# Patient Record
(demographics unavailable — no encounter records)

---

## 2024-11-30 NOTE — ASSESSMENT
[FreeTextEntry1] : A/P R lumbar spasm without radiculopathy - medrol dose tracy - muscle relaxant - TPI - f/u spine/pain management

## 2024-11-30 NOTE — PROCEDURE
[Trigger point 1-2 muscle groups] : trigger point 1-2 muscle groups [Right] : of the right [Lumbar paraspinal muscle] : lumbar paraspinal muscle [Pain] : pain [Inflammation] : inflammation [X-ray evidence of Osteoarthritis on this or prior visit] : x-ray evidence of Osteoarthritis on this or prior visit [Alcohol] : alcohol [Betadine] : betadine [Ethyl Chloride sprayed topically] : ethyl chloride sprayed topically [Sterile technique used] : sterile technique used [___ cc    3mg] :  Betamethasone (Celestone) ~Vcc of 3mg [___ cc    1%] : Lidocaine ~Vcc of 1%  [___ cc    0.25%] : Bupivacaine (Marcaine) ~Vcc of 0.25%  [] : Patient tolerated procedure well [Call if redness, pain or fever occur] : call if redness, pain or fever occur [Apply ice for 15min out of every hour for the next 12-24 hours as tolerated] : apply ice for 15 minutes out of every hour for the next 12-24 hours as tolerated [Patient was advised to rest the joint(s) for ____ days] : patient was advised to rest the joint(s) for [unfilled] days [Risks, benefits, alternatives discussed / Verbal consent obtained] : the risks benefits, and alternatives have been discussed, and verbal consent was obtained

## 2024-11-30 NOTE — IMAGING
[No bony abnormalities] : No bony abnormalities [Disc space narrowing] : Disc space narrowing [AP] : anteroposterior [There are no fractures, subluxations or dislocations. No significant abnormalities are seen] : There are no fractures, subluxations or dislocations. No significant abnormalities are seen [de-identified] : PE lumbar spine: +tenderness to R paraspinals, no midline tenderness, limited motion due to pain, able to SLR with pain, motor and sensory intact, NVI

## 2024-11-30 NOTE — HISTORY OF PRESENT ILLNESS
[Lower back] : lower back [9] : 9 [0] : 0 [Localized] : localized [Stabbing] : stabbing [Intermittent] : intermittent [Sitting] : sitting [de-identified] : 75 y/o M with atraumatic low back pain x 2 weeks. Denies numbness/tingling. denies bladder or bowel issues. He has tried Tylenol and aleve without relief. denies DM. s/p R CASSIUS 2002 [] : no [FreeTextEntry5] : Patient complains of low back pain for 2x weeks. no specific injury, pt has a right CASSIUS from 2002. no prior hx.

## 2024-12-10 NOTE — DISCUSSION/SUMMARY
[de-identified] : After discussing various treatment options with the patient including but not limited to oral medications, physical therapy, exercise, as well as interventional spinal injections, we have decided with the following plan:  - Continue home exercises, stretching, activity modification, physical therapy, and conservative care. - Will order lumbar MRI to rule out HNP. Please let this note serve as a formal request for authorization to perform a MRI of their Lumbar Spine. - Follow-up post diagnostic imaging to review and discuss further treatment. - Recommend continue Cyclobenzaprine 10mg BID PRN for muscle spasms and to assist with pain relief.

## 2024-12-10 NOTE — PHYSICAL EXAM
[de-identified] : Constitutional; Appears well, no apparent distress Ability to communicate: Normal  Respiratory: non-labored breathing Skin: No rash noted Head: Normocephalic, atraumatic Neck: no visible thyroid enlargement Eyes: Extraocular movements intact Neurologic: Alert and oriented x3 Psychiatric: normal mood, affect and behavior [] : non-antalgic

## 2024-12-10 NOTE — HISTORY OF PRESENT ILLNESS
[Lower back] : lower back [Right Leg] : right leg [Stabbing] : stabbing [] : yes [Frequent] : frequent [Household chores] : household chores [Rest] : rest [Injection therapy] : injection therapy [Sitting] : sitting [FreeTextEntry1] : Initial HPI 12/10/2024: Pain started 3 weeks ago and is on the RIGHT side of the lower back and radiates into the right buttock described as a sharp pain. Went to urgent care who recommended pain mgt. Pain has started to get better.   X-rays Lumbar spine: No bony abnormalities. Disc space narrowing.   MRI Lumbar Spine: none   Conservative Care: heat PRN  Pain Medications: Tylenol PRN; completed MDP with no relief; Flexeril is helping.  Past Injections:  TPI with good relief  Spine surgery: none  Blood thinners: *pt takes 81mg Aspirin  [FreeTextEntry7] : rt leg  [FreeTextEntry8] : driving  [de-identified] : twisting

## 2024-12-20 NOTE — ASSESSMENT
[FreeTextEntry1] : Previous doc: Right CASSIUS 2002 with mild asymmetric wear but no significant hip pain. Has all low back symptoms today. Also mod OA both knees (R > L) with good relief from HA injections in the past. PT and NSAIDs for Lspine, blood work for hip and monitor hip with serial XRs each year. Start HA injections b/l knees when he returns from vacation. 6/12/18: Inj tolerated well. 6/19/18: inj tolerated well. 6/26/18: inj tolerated well. 11/26/19: Mod OA both knees, no XR progression. Good long term relief from last HA series. Start orthovisc today both knees. 12/3/19: Inj tolerated well. 12/10/19: Inj tolerated well. 12/17/19: Inj tolerated well. 6/30/23: Moderate bilateral knee OA with some mild progression from 2019. Discussed anti-inflammatories, PT/HEP, CSI, visco injections, and briefly TKA. Will repeat Orthovisc series today as it has been very beneficial to him in the past. f/up in 1 week to continue series  9/11/23: Reeval today and symptoms are unchanged after fist orthovisc 3 months ago.  Discussed risks and benefits of proceeding after he had delay in treatment but would like to resume injections.  Orthovisc #2 both knees tolerated well, return next week. 9/19/23: Inj tolerated well. 9/25/23: Inj tolerated well  12/19/24: Right-sided lumbar spasm. R CASSIUS is stable and doing well. he is seeing Dr. JUAN Calix and is scheduled for MRI L-spine in a couple weeks. I am recommending the use of Meloxicam 15mg daily and discussed the risks and benefits of the medication, patient elects to proceed with this. Will also send cyclobenzaprine 10mg and give a PT rx. He is going to follow up in the new year with KUNAL Singh to consider restarting Orthovisc series for his knees.

## 2024-12-20 NOTE — HISTORY OF PRESENT ILLNESS
[Dull/Aching] : dull/aching [Throbbing] : throbbing [de-identified] : 12/19/24: 77yo M with right-sided lower back pain for the past few weeks with no injury. He saw KUNAL Pratt and had a TPI with good relief. Also taking cyclobenzaprine.   Previous doc: Right CASSIUS 2002 with Dr. Campos. Started having some pain over the past few months. 1 time episode of groin pain which has not happened again. Some improvement with Tylenol. Also has b/l knee OA - multiple HA injections in the past with great pain relief. 6/12/18: Orthovisc #1 b/l knees. 6/19/18: Orthovisc #2 b/l knees. 6/26/18: Orthovisc #3 b/l knees, improving. 11/16/19: HA inj in June 2018 helped until 2 months ago. Pain is mild. 12/3/19: Orthovisc #2 b/l knees. 12/10/19: Orthovisc #3 b/l knees. 12/17/19: Orthovisc #4 b/l knees, no pain. 6/30/23: 75yo M, known to me for bilateral knee OA, presents with gradually worsening pain over the past 2 months with no injury. He finished an Orthovisc series in 2019 that provided much relief. Some pain and difficulty with prolonged walking/standing, startup, and stairs. He is able to perform all his daily activities, but now with some pain. Occasional Tylenol  9/11/23: Had orthovisc #1 on 6/30 then difficulty getting appointments after that and then was sick.  Here today to resume injections. 9/19/23: Orthovisc #3 b/l knees. 9/25/23: Orthovisc #4 bilateral knees  [FreeTextEntry5] : hx of R CASSIUS with Dr Roblero. went to  and saw PA and was given CSI injection and was given nsaids. Was told it was a pull from the back. pain worse movement.  also Dr Calix and was recommended to get an MRI.

## 2024-12-20 NOTE — DISCUSSION/SUMMARY
[de-identified] : The patient was advised of the diagnosis.  The natural history of the pathology was explained in full to the patient in layman's terms. All questions were answered.  The risks and benefits of surgical and non-surgical treatment alternatives were explained in full to the patient.  The patient's orthopaedic condition(s) warrants intermittent use of a prescription strength non-steroidal anti-inflammatory medication.  These medications are associated with risks including but not limited to gastrointestinal irritation, kidney damage, hypertension, and bleeding.  The patient understands and will take medications as prescribed.  The patient will stop the medication and consult a physician as needed if problems arise.  Progress Note completed by Annika Carmona PA-C * Dr. Roblero -- The documentation recorded in this note accurately reflects the decisions made by me during this visit.  I interviewed and examined the patient personally and oversaw all medical decisions.

## 2024-12-20 NOTE — IMAGING
[de-identified] : RIGHT KNEE No Effusion +Medial joint line tenderness ROM 0-120 5/5 Strength NVI   LEFT KNEE No Effusion +Medial joint line tenderness ROM 0-120 5/5 Strength NVI Non-antalgic gait w/o assistance  RIGHT HIP (-) Groin tenderness (-) Greater troch bursa tenderness (-) Buttock tenderness +Right lower back tenderness  Limited flexion, internal rotation, external rotation No pain with ROM NVI

## 2025-01-17 NOTE — DISCUSSION/SUMMARY
[de-identified] : The patient was advised of the diagnosis.  The natural history of the pathology was explained in full to the patient in layman's terms. All questions were answered.  The risks and benefits of surgical and non-surgical treatment alternatives were explained in full to the patient.  I saw the patient under the supervision of Dr. Roblero and followed his plan of care.

## 2025-01-17 NOTE — PROCEDURE
[Large Joint Injection] : Large joint injection [Bilateral] : bilaterally of the [Knee] : knee [Pain] : pain [Inflammation] : inflammation [X-ray evidence of Osteoarthritis on this or prior visit] : x-ray evidence of Osteoarthritis on this or prior visit [Repeat series performed] : repeat series performed [Betadine] : betadine [Ethyl Chloride sprayed topically] : ethyl chloride sprayed topically [Sterile technique used] : sterile technique used [Orthovisc (30mg)] : 30mg of Orthovisc [#1] : series #1 [] : Patient tolerated procedure well [Call if redness, pain or fever occur] : call if redness, pain or fever occur [Apply ice for 15min out of every hour for the next 12-24 hours as tolerated] : apply ice for 15 minutes out of every hour for the next 12-24 hours as tolerated [Previous OTC use and PT nontherapeutic] : patient has tried OTC's including aspirin, Ibuprofen, Aleve, etc or prescription NSAIDS, and/or exercises at home and/or physical therapy without satisfactory response [Risks, benefits, alternatives discussed / Verbal consent obtained] : the risks benefits, and alternatives have been discussed, and verbal consent was obtained [All ultrasound images have been permanently captured and stored accordingly in our picture archiving and communication system] : All ultrasound images have been permanently captured and stored accordingly in our picture archiving and communication system [Visualization of the needle and placement of injection was performed without complication] : visualization of the needle and placement of injection was performed without complication

## 2025-01-17 NOTE — HISTORY OF PRESENT ILLNESS
[Dull/Aching] : dull/aching [de-identified] : 1/17/25: Went to PT for Lspine but started having more knee pain.  Both knees hurt equally,  Previous doc: Right CASSIUS 2002 with Dr. Campos. Started having some pain over the past few months. 1 time episode of groin pain which has not happened again. Some improvement with Tylenol. Also has b/l knee OA - multiple HA injections in the past with great pain relief. 6/12/18: Orthovisc #1 b/l knees. 6/19/18: Orthovisc #2 b/l knees. 6/26/18: Orthovisc #3 b/l knees, improving. 11/16/19: HA inj in June 2018 helped until 2 months ago. Pain is mild. 12/3/19: Orthovisc #2 b/l knees. 12/10/19: Orthovisc #3 b/l knees. 12/17/19: Orthovisc #4 b/l knees, no pain. 6/30/23: 75yo M, known to me for bilateral knee OA, presents with gradually worsening pain over the past 2 months with no injury. He finished an Orthovisc series in 2019 that provided much relief. Some pain and difficulty with prolonged walking/standing, startup, and stairs. He is able to perform all his daily activities, but now with some pain. Occasional Tylenol  9/11/23: Had orthovisc #1 on 6/30 then difficulty getting appointments after that and then was sick.  Here today to resume injections. 9/19/23: Orthovisc #3 b/l knees. 9/25/23: Orthovisc #4 bilateral knees  12/19/24: 75yo M with right-sided lower back pain for the past few weeks with no injury. He saw KUNAL Pratt and had a TPI with good relief. Also taking cyclobenzaprine.  [FreeTextEntry5] : wants to repeat orthovisc

## 2025-01-17 NOTE — ASSESSMENT
[FreeTextEntry1] : Previous doc: Right CASSIUS 2002 with mild asymmetric wear but no significant hip pain. Has all low back symptoms today. Also mod OA both knees (R > L) with good relief from HA injections in the past. PT and NSAIDs for Lspine, blood work for hip and monitor hip with serial XRs each year. Start HA injections b/l knees when he returns from vacation. 6/12/18: Inj tolerated well. 6/19/18: inj tolerated well. 6/26/18: inj tolerated well. 11/26/19: Mod OA both knees, no XR progression. Good long term relief from last HA series. Start orthovisc today both knees. 12/3/19: Inj tolerated well. 12/10/19: Inj tolerated well. 12/17/19: Inj tolerated well. 6/30/23: Moderate bilateral knee OA with some mild progression from 2019. Discussed anti-inflammatories, PT/HEP, CSI, visco injections, and briefly TKA. Will repeat Orthovisc series today as it has been very beneficial to him in the past. f/up in 1 week to continue series  9/11/23: Reeval today and symptoms are unchanged after fist orthovisc 3 months ago.  Discussed risks and benefits of proceeding after he had delay in treatment but would like to resume injections.  Orthovisc #2 both knees tolerated well, return next week. 9/19/23: Inj tolerated well. 9/25/23: Inj tolerated well 12/19/24: Right-sided lumbar spasm. R CASSIUS is stable and doing well. he is seeing Dr. JUAN Calix and is scheduled for MRI L-spine in a couple weeks. I am recommending the use of Meloxicam 15mg daily and discussed the risks and benefits of the medication, patient elects to proceed with this. Will also send cyclobenzaprine 10mg and give a PT rx. He is going to follow up in the new year with KUNAL Singh to consider restarting Orthovisc series for his knees.   1/17/25: No chagne in knee XR - has mod OA both knees, worsening recently and excellent results from HA in the past.  Start orthovisc both knees today, tolerated well.

## 2025-01-17 NOTE — IMAGING
[Bilateral] : knee bilaterally [AP] : anteroposterior [Lateral] : lateral [[place]] : [place] [Moderate tricompartmental OA medial narrowing] : Moderate tricompartmental OA medial narrowing [de-identified] : RIGHT KNEE No Effusion +Medial joint line tenderness ROM 0-120 5/5 Strength NVI   LEFT KNEE No Effusion +Medial joint line tenderness ROM 0-120 5/5 Strength NVI Non-antalgic gait w/o assistance  RIGHT HIP (-) Groin tenderness (-) Greater troch bursa tenderness (-) Buttock tenderness +Right lower back tenderness  Limited flexion, internal rotation, external rotation No pain with ROM NVI

## 2025-01-21 NOTE — DISCUSSION/SUMMARY
[de-identified] : After discussing various treatment options with the patient including but not limited to oral medications, physical therapy, exercise, as well as interventional spinal injections, we have decided with the following plan:  - Continue home exercises, stretching, activity modification, physical therapy, and conservative care. - Will order lumbar MRI to rule out HNP. Please let this note serve as a formal request for authorization to perform a MRI of their Lumbar Spine. - Follow-up post diagnostic imaging to review and discuss further treatment. - Recommend continue Cyclobenzaprine 10mg BID PRN for muscle spasms and to assist with pain relief.

## 2025-01-21 NOTE — HISTORY OF PRESENT ILLNESS
[Lower back] : lower back [Right Leg] : right leg [Stabbing] : stabbing [] : yes [Frequent] : frequent [Household chores] : household chores [Rest] : rest [Injection therapy] : injection therapy [Sitting] : sitting [FreeTextEntry1] : 1/21/25- mri follow up  Initial HPI 12/10/2024: Pain started 3 weeks ago and is on the RIGHT side of the lower back and radiates into the right buttock described as a sharp pain. Went to urgent care who recommended pain mgt. Pain has started to get better.   X-rays Lumbar spine: No bony abnormalities. Disc space narrowing.   MRI Lumbar Spine: none   Conservative Care: heat PRN  Pain Medications: Tylenol PRN; completed MDP with no relief; Flexeril is helping.  Past Injections:  TPI with good relief  Spine surgery: none  Blood thinners: *pt takes 81mg Aspirin  [FreeTextEntry7] : rt leg  [FreeTextEntry8] : driving  [de-identified] : twisting

## 2025-01-21 NOTE — PHYSICAL EXAM
[de-identified] : Constitutional; Appears well, no apparent distress Ability to communicate: Normal  Respiratory: non-labored breathing Skin: No rash noted Head: Normocephalic, atraumatic Neck: no visible thyroid enlargement Eyes: Extraocular movements intact Neurologic: Alert and oriented x3 Psychiatric: normal mood, affect and behavior [] : non-antalgic

## 2025-02-04 NOTE — PROCEDURE
[Large Joint Injection] : Large joint injection [Bilateral] : bilaterally of the [Knee] : knee [Pain] : pain [Inflammation] : inflammation [X-ray evidence of Osteoarthritis on this or prior visit] : x-ray evidence of Osteoarthritis on this or prior visit [Repeat series performed] : repeat series performed [Betadine] : betadine [Ethyl Chloride sprayed topically] : ethyl chloride sprayed topically [Sterile technique used] : sterile technique used [Orthovisc (30mg)] : 30mg of Orthovisc [] : Patient tolerated procedure well [Call if redness, pain or fever occur] : call if redness, pain or fever occur [Apply ice for 15min out of every hour for the next 12-24 hours as tolerated] : apply ice for 15 minutes out of every hour for the next 12-24 hours as tolerated [Previous OTC use and PT nontherapeutic] : patient has tried OTC's including aspirin, Ibuprofen, Aleve, etc or prescription NSAIDS, and/or exercises at home and/or physical therapy without satisfactory response [Risks, benefits, alternatives discussed / Verbal consent obtained] : the risks benefits, and alternatives have been discussed, and verbal consent was obtained [All ultrasound images have been permanently captured and stored accordingly in our picture archiving and communication system] : All ultrasound images have been permanently captured and stored accordingly in our picture archiving and communication system [Visualization of the needle and placement of injection was performed without complication] : visualization of the needle and placement of injection was performed without complication [#3] : series #3 [Alcohol] : alcohol

## 2025-04-03 NOTE — DISCUSSION/SUMMARY
[de-identified] : - discussed the etiology/ pathophysiology of the patient's complaints today in layman's terms - reviewed treatment options, conservative and operative as well as the indications for each - discussed conservative treatment options including the role for anti-inflammatory medications, injections and therapy - reviewed risks, benefits and alternatives to these - patient requests left shoulder CSI, tolerated without complication - activity modifications reviewed - NSAIDs prn pain - f/u in 2 weeks for repeat clinical exam   My cumulative time spent on this patient's visit included: Preparation for the visit, review of the medical records, review of pertinent diagnostic studies, examination and counseling of the patient on the above diagnosis, treatment plan and prognosis, orders of diagnostic tests, medications and/or appropriate procedures and documentation in the medical records of today's visit. 30 minutes were spent on this encounter

## 2025-04-03 NOTE — HISTORY OF PRESENT ILLNESS
[de-identified] : 04/02/2025 LAURA TRAN is a 76-year-old male here today for: left shoulder pain  Location: left shoulder  Complaint: The patient presents to the office today for evaluation of left shoulder pain which began roughly a month ago.  He notes the insidious onset of dull achy pain associated with activities.  No specific injuries that he can recall.  He denies radiating pain, numbness and tingling.  He does have a history of somewhat similar symptoms many years ago treated with a CSI and inquires about repeat CSI today.  Symptom onset: 3/8/25 Prior treatments: CSI Hand Dominance: right  Occupation: retired  PMH: Denies Allergies: NKDA

## 2025-04-03 NOTE — DISCUSSION/SUMMARY
[de-identified] : - discussed the etiology/ pathophysiology of the patient's complaints today in layman's terms - reviewed treatment options, conservative and operative as well as the indications for each - discussed conservative treatment options including the role for anti-inflammatory medications, injections and therapy - reviewed risks, benefits and alternatives to these - patient requests left shoulder CSI, tolerated without complication - activity modifications reviewed - NSAIDs prn pain - f/u in 2 weeks for repeat clinical exam   My cumulative time spent on this patient's visit included: Preparation for the visit, review of the medical records, review of pertinent diagnostic studies, examination and counseling of the patient on the above diagnosis, treatment plan and prognosis, orders of diagnostic tests, medications and/or appropriate procedures and documentation in the medical records of today's visit. 30 minutes were spent on this encounter

## 2025-04-03 NOTE — PROCEDURE
[FreeTextEntry1] : Corticosteroid injection [FreeTextEntry2] : Left shoulder impingement [FreeTextEntry3] : The risks and benefits of steroid injections were discussed. Verbal consent was obtained, The site was prepped in a sterile fashion with alcohol A combination of 6 mg (1cc) of Celestone and 2cc 1% xylocaine were injected under sterile conditions at the level of the left glenohumeral joint Patient tolerated the procedure without complication and was counseled on post injection expectations.

## 2025-04-03 NOTE — IMAGING
[de-identified] : Full neck range of motion - Spurling's - Zambrano's  Shoulder (R / L) Normal muscle tone/ bulk TTP at anterior and lateral joints  / 170 Abd 80 / 80 ER at side 60 / 60 IR T10 / T10 SILT throughout  Left shoulder 5/5 abduction 5/5 flexion in the plane of the scapula  5/5 external rotation 5/5 internal rotation  + René's + Hawkin's - Belly Press - Taylor's  2 views left shoulder: No acute fractures or malalignment, minimal djd

## 2025-04-03 NOTE — HISTORY OF PRESENT ILLNESS
[de-identified] : 04/02/2025 LAURA TRAN is a 76-year-old male here today for: left shoulder pain  Location: left shoulder  Complaint: The patient presents to the office today for evaluation of left shoulder pain which began roughly a month ago.  He notes the insidious onset of dull achy pain associated with activities.  No specific injuries that he can recall.  He denies radiating pain, numbness and tingling.  He does have a history of somewhat similar symptoms many years ago treated with a CSI and inquires about repeat CSI today.  Symptom onset: 3/8/25 Prior treatments: CSI Hand Dominance: right  Occupation: retired  PMH: Denies Allergies: NKDA

## 2025-04-03 NOTE — IMAGING
[de-identified] : Full neck range of motion - Spurling's - Zambrano's  Shoulder (R / L) Normal muscle tone/ bulk TTP at anterior and lateral joints  / 170 Abd 80 / 80 ER at side 60 / 60 IR T10 / T10 SILT throughout  Left shoulder 5/5 abduction 5/5 flexion in the plane of the scapula  5/5 external rotation 5/5 internal rotation  + René's + Hawkin's - Belly Press - Taylor's  2 views left shoulder: No acute fractures or malalignment, minimal djd

## 2025-04-04 NOTE — PLAN
[FreeTextEntry1] : 76M, here for annual physical  Pt counseled on shingrix vaccine. RSV  prevna 20 today

## 2025-04-04 NOTE — PHYSICAL EXAM
[Normal] : no acute distress, well nourished, well developed and well-appearing [No Acute Distress] : no acute distress [Well Nourished] : well nourished [Well Developed] : well developed [Well-Appearing] : well-appearing [Normal Sclera/Conjunctiva] : normal sclera/conjunctiva [PERRL] : pupils equal round and reactive to light [EOMI] : extraocular movements intact [Normal Outer Ear/Nose] : the outer ears and nose were normal in appearance [Normal Oropharynx] : the oropharynx was normal [No JVD] : no jugular venous distention [No Lymphadenopathy] : no lymphadenopathy [Supple] : supple [Thyroid Normal, No Nodules] : the thyroid was normal and there were no nodules present [No Respiratory Distress] : no respiratory distress  [No Accessory Muscle Use] : no accessory muscle use [Clear to Auscultation] : lungs were clear to auscultation bilaterally [Normal Rate] : normal rate  [Regular Rhythm] : with a regular rhythm [Normal S1, S2] : normal S1 and S2 [No Murmur] : no murmur heard [No Carotid Bruits] : no carotid bruits [No Abdominal Bruit] : a ~M bruit was not heard ~T in the abdomen [No Varicosities] : no varicosities [No Edema] : there was no peripheral edema [No Palpable Aorta] : no palpable aorta [No Extremity Clubbing/Cyanosis] : no extremity clubbing/cyanosis [Soft] : abdomen soft [Non Tender] : non-tender [Non-distended] : non-distended [No Masses] : no abdominal mass palpated [No HSM] : no HSM [Normal Bowel Sounds] : normal bowel sounds [Normal Posterior Cervical Nodes] : no posterior cervical lymphadenopathy [Normal Anterior Cervical Nodes] : no anterior cervical lymphadenopathy [No CVA Tenderness] : no CVA  tenderness [No Spinal Tenderness] : no spinal tenderness [No Joint Swelling] : no joint swelling [Grossly Normal Strength/Tone] : grossly normal strength/tone [No Rash] : no rash [Coordination Grossly Intact] : coordination grossly intact [No Focal Deficits] : no focal deficits [Normal Gait] : normal gait [Normal Affect] : the affect was normal [Normal Insight/Judgement] : insight and judgment were intact

## 2025-04-04 NOTE — HISTORY OF PRESENT ILLNESS
[de-identified] : 76M, here for annual physical.  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  HTN on amlodipine 10mg qd, metoprolol 50mg qd, BP at goal nonobstructive CAD/HLD on aspirin 81mg, CoQ10 100mg, Fish oil, pravastatin 40mg, Cardiologist Dr. Low, last seen 11/2024 b12 deficiency:  on MVI QOD,  fatty liver, GERD, IBS-C: on Miralax 40mg daily, Pantoprazole 40mg, f/b Dr. Toney, last seen 12/2024, reports persistent constipation, Linzess was too expensive. Pt is due for repeat colonoscopy  chronic knee osteoarthritis/back pain/sciatica: f/b Dr. Roblero previously, on cyclobenzaprine 10mg, currently getting knee injections in b/l knee with Dr. Singh, Pt seeing Dr. Hubbard for hand osteoarthritis and left shoulder rotator cuff impingement  reduced hearing: scheduled to see ENT next week  Pt is seeing derm for skin cancer check.

## 2025-04-04 NOTE — HISTORY OF PRESENT ILLNESS
[de-identified] : 76M, here for annual physical.  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  HTN on amlodipine 10mg qd, metoprolol 50mg qd, BP at goal nonobstructive CAD/HLD on aspirin 81mg, CoQ10 100mg, Fish oil, pravastatin 40mg, Cardiologist Dr. Low, last seen 11/2024 b12 deficiency:  on MVI QOD,  fatty liver, GERD, IBS-C: on Miralax 40mg daily, Pantoprazole 40mg, f/b Dr. Toney, last seen 12/2024, reports persistent constipation, Linzess was too expensive. Pt is due for repeat colonoscopy  chronic knee osteoarthritis/back pain/sciatica: f/b Dr. Roblero previously, on cyclobenzaprine 10mg, currently getting knee injections in b/l knee with Dr. Singh, Pt seeing Dr. Hubbard for hand osteoarthritis and left shoulder rotator cuff impingement  reduced hearing: scheduled to see ENT next week  Pt is seeing derm for skin cancer check.

## 2025-04-04 NOTE — HISTORY OF PRESENT ILLNESS
[de-identified] : 76M, here for annual physical.  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  HTN on amlodipine 10mg qd, metoprolol 50mg qd, BP at goal nonobstructive CAD/HLD on aspirin 81mg, CoQ10 100mg, Fish oil, pravastatin 40mg, Cardiologist Dr. Low, last seen 11/2024 b12 deficiency:  on MVI QOD,  fatty liver, GERD, IBS-C: on Miralax 40mg daily, Pantoprazole 40mg, f/b Dr. Toney, last seen 12/2024, reports persistent constipation, Linzess was too expensive. Pt is due for repeat colonoscopy  chronic knee osteoarthritis/back pain/sciatica: f/b Dr. Roblero previously, on cyclobenzaprine 10mg, currently getting knee injections in b/l knee with Dr. Singh, Pt seeing Dr. Hubbard for hand osteoarthritis and left shoulder rotator cuff impingement  reduced hearing: scheduled to see ENT next week  Pt is seeing derm for skin cancer check.

## 2025-04-04 NOTE — HEALTH RISK ASSESSMENT
[Patient reported colonoscopy was normal] : Patient reported colonoscopy was normal [Good] : ~his/her~ current health as good [Poor] : ~his/her~ mood as  poor [Yes] : Yes [Monthly or less (1 pt)] : Monthly or less (1 point) [1 or 2 (0 pts)] : 1 or 2 (0 points) [Never (0 pts)] : Never (0 points) [No falls in past year] : Patient reported no falls in the past year [Never] : Never [Patient reported colonoscopy was abnormal] : Patient reported colonoscopy was abnormal [Hepatitis C test offered] : Hepatitis C test offered [None] : None [With Family] : lives with family [Retired] : retired [] :  [Feels Safe at Home] : Feels safe at home [Fully functional (bathing, dressing, toileting, transferring, walking, feeding)] : Fully functional (bathing, dressing, toileting, transferring, walking, feeding) [Fully functional (using the telephone, shopping, preparing meals, housekeeping, doing laundry, using] : Fully functional and needs no help or supervision to perform IADLs (using the telephone, shopping, preparing meals, housekeeping, doing laundry, using transportation, managing medications and managing finances) [Smoke Detector] : smoke detector [Carbon Monoxide Detector] : carbon monoxide detector [Safety elements used in home] : safety elements used in home [FreeTextEntry1] : orthopedic pain [Change in mental status noted] : No change in mental status noted [Language] : denies difficulty with language [Behavior] : denies difficulty with behavior [Learning/Retaining New Information] : denies difficulty learning/retaining new information [Handling Complex Tasks] : denies difficulty handling complex tasks [Reasoning] : denies difficulty with reasoning [Spatial Ability and Orientation] : denies difficulty with spatial ability and orientation [Reports changes in hearing] : Reports no changes in hearing [Reports changes in vision] : Reports no changes in vision [Reports normal functional visual acuity (ie: able to read med bottle)] : Reports poor functional visual acuity.  [Reports changes in dental health] : Reports no changes in dental health [Seat Belt] : does not use seat belt [Sunscreen] : does not use sunscreen [Travel to Developing Areas] : does not  travel to developing areas [TB Exposure] : is not being exposed to tuberculosis [Caregiver Concerns] : does not have caregiver concerns [ColonoscopyDate] : 9/2020 [ColonoscopyComments] : 3 colon polyps, repeat in 3 yrs [de-identified] : seeing ENT and opthal routinely

## 2025-04-04 NOTE — HEALTH RISK ASSESSMENT
[Patient reported colonoscopy was normal] : Patient reported colonoscopy was normal [Good] : ~his/her~ current health as good [Poor] : ~his/her~ mood as  poor [Yes] : Yes [Monthly or less (1 pt)] : Monthly or less (1 point) [1 or 2 (0 pts)] : 1 or 2 (0 points) [Never (0 pts)] : Never (0 points) [No falls in past year] : Patient reported no falls in the past year [Never] : Never [Patient reported colonoscopy was abnormal] : Patient reported colonoscopy was abnormal [Hepatitis C test offered] : Hepatitis C test offered [None] : None [With Family] : lives with family [Retired] : retired [] :  [Feels Safe at Home] : Feels safe at home [Fully functional (bathing, dressing, toileting, transferring, walking, feeding)] : Fully functional (bathing, dressing, toileting, transferring, walking, feeding) [Fully functional (using the telephone, shopping, preparing meals, housekeeping, doing laundry, using] : Fully functional and needs no help or supervision to perform IADLs (using the telephone, shopping, preparing meals, housekeeping, doing laundry, using transportation, managing medications and managing finances) [Smoke Detector] : smoke detector [Carbon Monoxide Detector] : carbon monoxide detector [Safety elements used in home] : safety elements used in home [FreeTextEntry1] : orthopedic pain [Change in mental status noted] : No change in mental status noted [Language] : denies difficulty with language [Behavior] : denies difficulty with behavior [Learning/Retaining New Information] : denies difficulty learning/retaining new information [Handling Complex Tasks] : denies difficulty handling complex tasks [Reasoning] : denies difficulty with reasoning [Spatial Ability and Orientation] : denies difficulty with spatial ability and orientation [Reports changes in hearing] : Reports no changes in hearing [Reports changes in vision] : Reports no changes in vision [Reports normal functional visual acuity (ie: able to read med bottle)] : Reports poor functional visual acuity.  [Reports changes in dental health] : Reports no changes in dental health [Seat Belt] : does not use seat belt [Sunscreen] : does not use sunscreen [Travel to Developing Areas] : does not  travel to developing areas [TB Exposure] : is not being exposed to tuberculosis [Caregiver Concerns] : does not have caregiver concerns [ColonoscopyDate] : 9/2020 [ColonoscopyComments] : 3 colon polyps, repeat in 3 yrs [de-identified] : seeing ENT and opthal routinely

## 2025-04-04 NOTE — HEALTH RISK ASSESSMENT
[Patient reported colonoscopy was normal] : Patient reported colonoscopy was normal [Good] : ~his/her~ current health as good [Poor] : ~his/her~ mood as  poor [Yes] : Yes [Monthly or less (1 pt)] : Monthly or less (1 point) [1 or 2 (0 pts)] : 1 or 2 (0 points) [Never (0 pts)] : Never (0 points) [No falls in past year] : Patient reported no falls in the past year [Never] : Never [Patient reported colonoscopy was abnormal] : Patient reported colonoscopy was abnormal [Hepatitis C test offered] : Hepatitis C test offered [None] : None [With Family] : lives with family [Retired] : retired [] :  [Feels Safe at Home] : Feels safe at home [Fully functional (bathing, dressing, toileting, transferring, walking, feeding)] : Fully functional (bathing, dressing, toileting, transferring, walking, feeding) [Fully functional (using the telephone, shopping, preparing meals, housekeeping, doing laundry, using] : Fully functional and needs no help or supervision to perform IADLs (using the telephone, shopping, preparing meals, housekeeping, doing laundry, using transportation, managing medications and managing finances) [Smoke Detector] : smoke detector [Carbon Monoxide Detector] : carbon monoxide detector [Safety elements used in home] : safety elements used in home [FreeTextEntry1] : orthopedic pain [Change in mental status noted] : No change in mental status noted [Language] : denies difficulty with language [Behavior] : denies difficulty with behavior [Learning/Retaining New Information] : denies difficulty learning/retaining new information [Handling Complex Tasks] : denies difficulty handling complex tasks [Reasoning] : denies difficulty with reasoning [Spatial Ability and Orientation] : denies difficulty with spatial ability and orientation [Reports changes in hearing] : Reports no changes in hearing [Reports changes in vision] : Reports no changes in vision [Reports normal functional visual acuity (ie: able to read med bottle)] : Reports poor functional visual acuity.  [Reports changes in dental health] : Reports no changes in dental health [Seat Belt] : does not use seat belt [Sunscreen] : does not use sunscreen [Travel to Developing Areas] : does not  travel to developing areas [TB Exposure] : is not being exposed to tuberculosis [Caregiver Concerns] : does not have caregiver concerns [ColonoscopyDate] : 9/2020 [ColonoscopyComments] : 3 colon polyps, repeat in 3 yrs [de-identified] : seeing ENT and opthal routinely

## 2025-04-04 NOTE — HISTORY OF PRESENT ILLNESS
[de-identified] : 76M, here for annual physical.  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  HTN on amlodipine 10mg qd, metoprolol 50mg qd, BP at goal nonobstructive CAD/HLD on aspirin 81mg, CoQ10 100mg, Fish oil, pravastatin 40mg, Cardiologist Dr. Low, last seen 11/2024 b12 deficiency:  on MVI QOD,  fatty liver, GERD, IBS-C: on Miralax 40mg daily, Pantoprazole 40mg, f/b Dr. Toney, last seen 12/2024, reports persistent constipation, Linzess was too expensive. Pt is due for repeat colonoscopy  chronic knee osteoarthritis/back pain/sciatica: f/b Dr. Roblero previously, on cyclobenzaprine 10mg, currently getting knee injections in b/l knee with Dr. Singh, Pt seeing Dr. Hubbard for hand osteoarthritis and left shoulder rotator cuff impingement  reduced hearing: scheduled to see ENT next week  Pt is seeing derm for skin cancer check.

## 2025-04-04 NOTE — HEALTH RISK ASSESSMENT
[Patient reported colonoscopy was normal] : Patient reported colonoscopy was normal [Good] : ~his/her~ current health as good [Poor] : ~his/her~ mood as  poor [Yes] : Yes [Monthly or less (1 pt)] : Monthly or less (1 point) [1 or 2 (0 pts)] : 1 or 2 (0 points) [Never (0 pts)] : Never (0 points) [No falls in past year] : Patient reported no falls in the past year [Never] : Never [Patient reported colonoscopy was abnormal] : Patient reported colonoscopy was abnormal [Hepatitis C test offered] : Hepatitis C test offered [None] : None [With Family] : lives with family [Retired] : retired [] :  [Feels Safe at Home] : Feels safe at home [Fully functional (bathing, dressing, toileting, transferring, walking, feeding)] : Fully functional (bathing, dressing, toileting, transferring, walking, feeding) [Fully functional (using the telephone, shopping, preparing meals, housekeeping, doing laundry, using] : Fully functional and needs no help or supervision to perform IADLs (using the telephone, shopping, preparing meals, housekeeping, doing laundry, using transportation, managing medications and managing finances) [Smoke Detector] : smoke detector [Carbon Monoxide Detector] : carbon monoxide detector [Safety elements used in home] : safety elements used in home [FreeTextEntry1] : orthopedic pain [Change in mental status noted] : No change in mental status noted [Language] : denies difficulty with language [Behavior] : denies difficulty with behavior [Learning/Retaining New Information] : denies difficulty learning/retaining new information [Handling Complex Tasks] : denies difficulty handling complex tasks [Reasoning] : denies difficulty with reasoning [Spatial Ability and Orientation] : denies difficulty with spatial ability and orientation [Reports changes in hearing] : Reports no changes in hearing [Reports changes in vision] : Reports no changes in vision [Reports normal functional visual acuity (ie: able to read med bottle)] : Reports poor functional visual acuity.  [Reports changes in dental health] : Reports no changes in dental health [Seat Belt] : does not use seat belt [Sunscreen] : does not use sunscreen [Travel to Developing Areas] : does not  travel to developing areas [TB Exposure] : is not being exposed to tuberculosis [Caregiver Concerns] : does not have caregiver concerns [ColonoscopyDate] : 9/2020 [ColonoscopyComments] : 3 colon polyps, repeat in 3 yrs [de-identified] : seeing ENT and opthal routinely

## 2025-04-04 NOTE — HEALTH RISK ASSESSMENT
[Patient reported colonoscopy was normal] : Patient reported colonoscopy was normal [Good] : ~his/her~ current health as good [Poor] : ~his/her~ mood as  poor [Yes] : Yes [Monthly or less (1 pt)] : Monthly or less (1 point) [1 or 2 (0 pts)] : 1 or 2 (0 points) [Never (0 pts)] : Never (0 points) [No falls in past year] : Patient reported no falls in the past year [Never] : Never [Patient reported colonoscopy was abnormal] : Patient reported colonoscopy was abnormal [Hepatitis C test offered] : Hepatitis C test offered [None] : None [With Family] : lives with family [Retired] : retired [] :  [Feels Safe at Home] : Feels safe at home [Fully functional (bathing, dressing, toileting, transferring, walking, feeding)] : Fully functional (bathing, dressing, toileting, transferring, walking, feeding) [Fully functional (using the telephone, shopping, preparing meals, housekeeping, doing laundry, using] : Fully functional and needs no help or supervision to perform IADLs (using the telephone, shopping, preparing meals, housekeeping, doing laundry, using transportation, managing medications and managing finances) [Smoke Detector] : smoke detector [Carbon Monoxide Detector] : carbon monoxide detector [Safety elements used in home] : safety elements used in home [FreeTextEntry1] : orthopedic pain [Change in mental status noted] : No change in mental status noted [Language] : denies difficulty with language [Behavior] : denies difficulty with behavior [Learning/Retaining New Information] : denies difficulty learning/retaining new information [Handling Complex Tasks] : denies difficulty handling complex tasks [Reasoning] : denies difficulty with reasoning [Spatial Ability and Orientation] : denies difficulty with spatial ability and orientation [Reports changes in hearing] : Reports no changes in hearing [Reports changes in vision] : Reports no changes in vision [Reports normal functional visual acuity (ie: able to read med bottle)] : Reports poor functional visual acuity.  [Reports changes in dental health] : Reports no changes in dental health [Seat Belt] : does not use seat belt [Sunscreen] : does not use sunscreen [Travel to Developing Areas] : does not  travel to developing areas [TB Exposure] : is not being exposed to tuberculosis [Caregiver Concerns] : does not have caregiver concerns [ColonoscopyDate] : 9/2020 [ColonoscopyComments] : 3 colon polyps, repeat in 3 yrs [de-identified] : seeing ENT and opthal routinely

## 2025-04-28 NOTE — IMAGING
[de-identified] : Full neck range of motion - Spurling's - Zambrano's  Shoulder (R / L) Normal muscle tone/ bulk TTP throughout   / 170 Abd 80 / 80 ER at side 60 / 60 IR T10 / T10 SILT throughout  Left shoulder 5/5 abduction 5/5 flexion in the plane of the scapula  5/5 external rotation 5/5 internal rotation  2 views left shoulder: No acute fractures or malalignment, minimal djd  Left hand Heberden's nodes TTP at the DIP joint of the middle finger + FDS/FDP Able to make a full composite fist +AIN/ PIN/ Ulnar n SILT throughout fingers wwp   3 views left hand: No acute fractures, moderate to severe degenerative change at the middle finger DIP joint

## 2025-04-28 NOTE — DISCUSSION/SUMMARY
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 including arthrodesis and the postoperative expectations - reviewed risks, benefits and alternatives to these - with all of this in mind the patient would like to continue with observation for now which I agree is reasonable - activity modifications reviewed - NSAIDs prn pain - f/u prn  My cumulative time spent on this patient's visit included: Preparation for the visit, review of the medical records, review of pertinent diagnostic studies, examination and counseling of the patient on the above diagnosis, treatment plan and prognosis, orders of diagnostic tests, medications and/or appropriate procedures and documentation in the medical records of today's visit. 30 minutes were spent on this encounter

## 2025-04-28 NOTE — DISCUSSION/SUMMARY
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 including arthrodesis and the postoperative expectations - reviewed risks, benefits and alternatives to these - with all of this in mind the patient would like to continue with observation for now which I agree is reasonable - activity modifications reviewed - NSAIDs prn pain - f/u prn  My cumulative time spent on this patient's visit included: Preparation for the visit, review of the medical records, review of pertinent diagnostic studies, examination and counseling of the patient on the above diagnosis, treatment plan and prognosis, orders of diagnostic tests, medications and/or appropriate procedures and documentation in the medical records of today's visit. 30 minutes were spent on this encounter

## 2025-04-28 NOTE — IMAGING
[de-identified] : Full neck range of motion - Spurling's - Zambrano's  Shoulder (R / L) Normal muscle tone/ bulk TTP throughout   / 170 Abd 80 / 80 ER at side 60 / 60 IR T10 / T10 SILT throughout  Left shoulder 5/5 abduction 5/5 flexion in the plane of the scapula  5/5 external rotation 5/5 internal rotation  2 views left shoulder: No acute fractures or malalignment, minimal djd  Left hand Heberden's nodes TTP at the DIP joint of the middle finger + FDS/FDP Able to make a full composite fist +AIN/ PIN/ Ulnar n SILT throughout fingers wwp   3 views left hand: No acute fractures, moderate to severe degenerative change at the middle finger DIP joint

## 2025-04-28 NOTE — HISTORY OF PRESENT ILLNESS
[de-identified] : 4/23/25: The patient returns office today for repeat evaluation of his left shoulder pain.  He received a CSI at his last visit and notes greater than 90% resolution of his previous pain.  He is quite happy with his outcome.  Additionally, the patient notes dull achy pain in the left middle finger DIP joint.  Symptoms began several months ago and are associated with writing and tight .  No injuries that he can recall.  04/02/2025 LAURA TRAN is a 76-year-old male here today for: left shoulder pain  Location: left shoulder  Complaint: The patient presents to the office today for evaluation of left shoulder pain which began roughly a month ago.  He notes the insidious onset of dull achy pain associated with activities.  No specific injuries that he can recall.  He denies radiating pain, numbness and tingling.  He does have a history of somewhat similar symptoms many years ago treated with a CSI and inquiries about repeat CSI today.  Symptom onset: 3/8/25 Prior treatments: CSI Hand Dominance: right  Occupation: retired  PMH: Denies Allergies: NKDA

## 2025-04-28 NOTE — HISTORY OF PRESENT ILLNESS
[de-identified] : 4/23/25: The patient returns office today for repeat evaluation of his left shoulder pain.  He received a CSI at his last visit and notes greater than 90% resolution of his previous pain.  He is quite happy with his outcome.  Additionally, the patient notes dull achy pain in the left middle finger DIP joint.  Symptoms began several months ago and are associated with writing and tight .  No injuries that he can recall.  04/02/2025 LAURA TRAN is a 76-year-old male here today for: left shoulder pain  Location: left shoulder  Complaint: The patient presents to the office today for evaluation of left shoulder pain which began roughly a month ago.  He notes the insidious onset of dull achy pain associated with activities.  No specific injuries that he can recall.  He denies radiating pain, numbness and tingling.  He does have a history of somewhat similar symptoms many years ago treated with a CSI and inquiries about repeat CSI today.  Symptom onset: 3/8/25 Prior treatments: CSI Hand Dominance: right  Occupation: retired  PMH: Denies Allergies: NKDA